# Patient Record
Sex: FEMALE | Race: WHITE | NOT HISPANIC OR LATINO | URBAN - METROPOLITAN AREA
[De-identification: names, ages, dates, MRNs, and addresses within clinical notes are randomized per-mention and may not be internally consistent; named-entity substitution may affect disease eponyms.]

---

## 2021-03-26 DIAGNOSIS — Z23 ENCOUNTER FOR IMMUNIZATION: ICD-10-CM

## 2024-10-09 NOTE — PRE-PROCEDURE INSTRUCTIONS
No outpatient medications have been marked as taking for the 10/14/24 encounter (Hospital Encounter).    Pt reports is not taking any prescription medications / and or OTC vitamins /supplements or herbals at this time.      Pt instructed on use of cleanser for DOS and instructions for showering both night before and DOS reviewed with pt - pt verbalized understanding of instructions given  Pt also instructed on no hair or body products on skin for DOS.  No shaving with a razor blade for 7 days prior to surgery to decrease any incident of infection - electric razor is ok to use up till 24 hrs prior to DOS.  Pt instructed that if with any changes in health status between now and DOS - notify surgeon office.  Tylenol is ok to use as needed up to and including DOS with sips of water - if needed - did instruct NO NSAIDS to be used at least 3 days prior to surgery - or if given a longer hold time of NSAIDS from MD please follow MD hold instructions.  Instructed to bring photo ID and medical insurance card for DOS as forms of identification for DOS.  Also instructed pt on no jewelry or body piercings, no valuables on body for DOS. Contact lenses, if worn - need to be removed for DOS.  Pt instructed does need transport home after surgery- pt is not allowed to drive.    Pt was instructed with the many questions she had related to surgery to call surgeon office and ask to have these questions verified.     Medication instructions for day surgery reviewed. Please use only a sip of water to take your instructed medications. Avoid all over the counter vitamins, supplements and NSAIDS for one week prior to surgery per anesthesia guidelines. Tylenol is ok to take as needed.     You will receive a call one business day prior to surgery with an arrival time and hospital directions. If your surgery is scheduled on a Monday, the hospital will be calling you on the Friday prior to your surgery. If you have not heard from anyone by 8pm,  please call the hospital supervisor through the hospital  at [phone]. ([place] [phone] or [place] [phone]).    Do not eat or drink anything after midnight the night before your surgery, including candy, mints, lifesavers, or chewing gum. Do not drink alcohol 24hrs before your surgery. Try not to smoke at least 24hrs before your surgery.       Follow the pre surgery showering instructions as listed in the “My Surgical Experience Booklet” or otherwise provided by your surgeon's office. Do not use a blade to shave the surgical area 1 week before surgery. It is okay to use a clean electric clippers up to 24 hours before surgery. Do not apply any lotions, creams, including makeup, cologne, deodorant, or perfumes after showering on the day of your surgery. Do not use dry shampoo, hair spray, hair gel, or any type of hair products.     No contact lenses, eye make-up, or artificial eyelashes. Remove nail polish, including gel polish, and any artificial, gel, or acrylic nails if possible. Remove all jewelry including rings and body piercing jewelry.     Wear causal clothing that is easy to take on and off. Consider your type of surgery.    Keep any valuables, jewelry, piercings at home. Please bring any specially ordered equipment (sling, braces) if indicated.    Arrange for a responsible person to drive you to and from the hospital on the day of your surgery. Please confirm the visitor policy for the day of your procedure when you receive your phone call with an arrival time.     Call the surgeon's office with any new illnesses, exposures, or additional questions prior to surgery.    Please reference your “My Surgical Experience Booklet” for additional information to prepare for your upcoming surgery.

## 2024-10-11 ENCOUNTER — ANESTHESIA EVENT (OUTPATIENT)
Dept: PERIOP | Facility: HOSPITAL | Age: 44
End: 2024-10-11
Payer: COMMERCIAL

## 2024-10-11 PROBLEM — N39.3 SUI (STRESS URINARY INCONTINENCE, FEMALE): Status: ACTIVE | Noted: 2024-10-11

## 2024-10-12 NOTE — DISCHARGE INSTRUCTIONS
Urethral Sling Procedure   WHAT YOU NEED TO KNOW:   A bladder sling procedure is surgery to treat urinary incontinence in women. The sling acts as a hammock to keep your urethra in place and hold it closed when your bladder is full. You may have vaginal bleeding or discharge for up to a week after your surgery. Use sanitary pads. Do not use tampons. You may have some pelvic discomfort or trouble urinating.   DISCHARGE INSTRUCTIONS:   Call 911 for any of the following:   You have sudden trouble breathing.    Seek care immediately if:   Your bleeding gets worse.    You have yellow or foul smelling discharge from your vagina.    You cannot urinate, or you are urinating less than what is normal for you.    You feel confused.  Contact your healthcare provider if:   You have a fever.    You do not feel like you are able to empty your bladder completely when you urinate.    You feel the need to urinate very suddenly.    You have burning or stinging when you urinate.    You have blood in your urine.    Your skin is itchy, swollen, or you have a rash.    You have questions or concerns about your condition or care.  Medicines:   Prescription pain medicine  may be given. Ask your how to take this medicine safely.    Take your medicine as directed.  Contact your healthcare provider if you think your medicine is not helping or if you have side effects. Tell him or her if you are allergic to any medicine. Keep a list of the medicines, vitamins, and herbs you take. Include the amounts, and when and why you take them. Bring the list or the pill bottles to follow-up visits. Carry your medicine list with you in case of an emergency.  Self-catheterization:  You may need to put a catheter into your bladder after you urinate to empty any remaining urine. A catheter is a small rubber tube used to drain urine. Healthcare providers will teach you how to put the catheter in safely. This may be needed until you are completely emptying your  bladder.  Dc catheter:  You may have a Dc catheter for a short period of time. The Dc is a tube put into your bladder to drain urine into a bag. Keep the bag below your waist. This will prevent urine from flowing back into your bladder and causing an infection or other problems. Also, keep the tube free of kinks so the urine will drain properly. Do not pull on the catheter. This can cause pain and bleeding, and may cause the catheter to come out.   Activity:  Do not lift heavy objects for 6 weeks after your procedure. Do not have intercourse for 4 to 6 weeks. Do not use a tampon for 4 weeks. Ask your healthcare provider when you can return to work or your usual activities.  Do pelvic muscle exercises:  These are also called Kegel exercises. These exercises help strengthen your pelvic muscles and help prevent urine leakage. Tighten the muscles of your pelvis and hold them tight for 5 seconds, then relax for 5 seconds. Gradually work up to tightening them for 10 seconds and relaxing for 10 seconds. Do this 3 times each day.  Keep a record:  Keep a record of when you urinate and if you leak any urine. Write down what you were doing when you leaked urine, such as coughing or sneezing. Bring the log to your follow-up visits.  Prevent constipation:  Drink liquids as directed. You may need to drink more water than usual to soften your bowel movements. Eat a variety of healthy foods, especially fruit and foods high in fiber. You may need to use an over-the-counter bowel movement softener.  Follow up with your healthcare provider as directed:  You may need a test to check how much urine remains in your bladder after you urinate. This will help show how the sling is working. Write down your questions so you remember to ask them during your visits.  © 2017 [employer] Information is for End User's use only and may not be sold, redistributed or otherwise used for commercial purposes. All illustrations  and images included in CareNotes® are the copyrighted property of [employer], [employer]. or [employer].  The above information is an  only. It is not intended as medical advice for individual conditions or treatments. Talk to your doctor, nurse or pharmacist before following any medical regimen to see if it is safe and effective for you.

## 2024-10-14 ENCOUNTER — ANESTHESIA (OUTPATIENT)
Dept: PERIOP | Facility: HOSPITAL | Age: 44
End: 2024-10-14
Payer: COMMERCIAL

## 2024-10-14 ENCOUNTER — HOSPITAL ENCOUNTER (OUTPATIENT)
Facility: HOSPITAL | Age: 44
Setting detail: OUTPATIENT SURGERY
Discharge: HOME | End: 2024-10-14
Attending: OBSTETRICS & GYNECOLOGY | Admitting: OBSTETRICS & GYNECOLOGY
Payer: COMMERCIAL

## 2024-10-14 VITALS
SYSTOLIC BLOOD PRESSURE: 105 MMHG | OXYGEN SATURATION: 100 % | HEIGHT: 66 IN | TEMPERATURE: 98.5 F | HEART RATE: 57 BPM | BODY MASS INDEX: 26.29 KG/M2 | RESPIRATION RATE: 14 BRPM | WEIGHT: 163.58 LBS | DIASTOLIC BLOOD PRESSURE: 65 MMHG

## 2024-10-14 LAB
EXT PREGNANCY TEST URINE: NEGATIVE
EXT. CONTROL: NORMAL

## 2024-10-14 PROCEDURE — 51798 US URINE CAPACITY MEASURE: CPT | Performed by: OBSTETRICS & GYNECOLOGY

## 2024-10-14 PROCEDURE — 81025 URINE PREGNANCY TEST: CPT | Performed by: ANESTHESIOLOGY

## 2024-10-14 PROCEDURE — C1771 REP DEV, URINARY, W/SLING: HCPCS | Performed by: OBSTETRICS & GYNECOLOGY

## 2024-10-14 DEVICE — SINGLE INCISION SLING SYSTEM
Type: IMPLANTABLE DEVICE | Site: VAGINA | Status: FUNCTIONAL
Brand: ALTIS

## 2024-10-14 RX ORDER — ONDANSETRON 2 MG/ML
4 INJECTION INTRAMUSCULAR; INTRAVENOUS EVERY 6 HOURS PRN
Status: DISCONTINUED | OUTPATIENT
Start: 2024-10-14 | End: 2024-10-14 | Stop reason: HOSPADM

## 2024-10-14 RX ORDER — ONDANSETRON 2 MG/ML
4 INJECTION INTRAMUSCULAR; INTRAVENOUS ONCE AS NEEDED
Status: DISCONTINUED | OUTPATIENT
Start: 2024-10-14 | End: 2024-10-14 | Stop reason: HOSPADM

## 2024-10-14 RX ORDER — ACETAMINOPHEN 325 MG/1
975 TABLET ORAL ONCE
Status: COMPLETED | OUTPATIENT
Start: 2024-10-14 | End: 2024-10-14

## 2024-10-14 RX ORDER — IBUPROFEN 600 MG/1
600 TABLET, FILM COATED ORAL EVERY 6 HOURS PRN
Status: DISCONTINUED | OUTPATIENT
Start: 2024-10-14 | End: 2024-10-14 | Stop reason: HOSPADM

## 2024-10-14 RX ORDER — SODIUM CHLORIDE 9 MG/ML
125 INJECTION, SOLUTION INTRAVENOUS CONTINUOUS
Status: DISCONTINUED | OUTPATIENT
Start: 2024-10-14 | End: 2024-10-14

## 2024-10-14 RX ORDER — MAGNESIUM HYDROXIDE 1200 MG/15ML
LIQUID ORAL AS NEEDED
Status: DISCONTINUED | OUTPATIENT
Start: 2024-10-14 | End: 2024-10-14 | Stop reason: HOSPADM

## 2024-10-14 RX ORDER — PROPOFOL 10 MG/ML
INJECTION, EMULSION INTRAVENOUS CONTINUOUS PRN
Status: DISCONTINUED | OUTPATIENT
Start: 2024-10-14 | End: 2024-10-14

## 2024-10-14 RX ORDER — KETOROLAC TROMETHAMINE 30 MG/ML
INJECTION, SOLUTION INTRAMUSCULAR; INTRAVENOUS AS NEEDED
Status: DISCONTINUED | OUTPATIENT
Start: 2024-10-14 | End: 2024-10-14

## 2024-10-14 RX ORDER — ACETAMINOPHEN 325 MG/1
975 TABLET ORAL EVERY 6 HOURS PRN
Status: DISCONTINUED | OUTPATIENT
Start: 2024-10-14 | End: 2024-10-14 | Stop reason: HOSPADM

## 2024-10-14 RX ORDER — DEXAMETHASONE SODIUM PHOSPHATE 10 MG/ML
INJECTION, SOLUTION INTRAMUSCULAR; INTRAVENOUS AS NEEDED
Status: DISCONTINUED | OUTPATIENT
Start: 2024-10-14 | End: 2024-10-14

## 2024-10-14 RX ORDER — DEXMEDETOMIDINE HYDROCHLORIDE 100 UG/ML
INJECTION, SOLUTION INTRAVENOUS AS NEEDED
Status: DISCONTINUED | OUTPATIENT
Start: 2024-10-14 | End: 2024-10-14

## 2024-10-14 RX ORDER — FENTANYL CITRATE/PF 50 MCG/ML
25 SYRINGE (ML) INJECTION
Status: DISCONTINUED | OUTPATIENT
Start: 2024-10-14 | End: 2024-10-14 | Stop reason: HOSPADM

## 2024-10-14 RX ORDER — PROPOFOL 10 MG/ML
INJECTION, EMULSION INTRAVENOUS AS NEEDED
Status: DISCONTINUED | OUTPATIENT
Start: 2024-10-14 | End: 2024-10-14

## 2024-10-14 RX ORDER — FENTANYL CITRATE 50 UG/ML
INJECTION, SOLUTION INTRAMUSCULAR; INTRAVENOUS AS NEEDED
Status: DISCONTINUED | OUTPATIENT
Start: 2024-10-14 | End: 2024-10-14

## 2024-10-14 RX ORDER — CLINDAMYCIN PHOSPHATE 900 MG/50ML
900 INJECTION, SOLUTION INTRAVENOUS ONCE
Status: COMPLETED | OUTPATIENT
Start: 2024-10-14 | End: 2024-10-14

## 2024-10-14 RX ORDER — ONDANSETRON 2 MG/ML
INJECTION INTRAMUSCULAR; INTRAVENOUS AS NEEDED
Status: DISCONTINUED | OUTPATIENT
Start: 2024-10-14 | End: 2024-10-14

## 2024-10-14 RX ORDER — MIDAZOLAM HYDROCHLORIDE 2 MG/2ML
INJECTION, SOLUTION INTRAMUSCULAR; INTRAVENOUS AS NEEDED
Status: DISCONTINUED | OUTPATIENT
Start: 2024-10-14 | End: 2024-10-14

## 2024-10-14 RX ORDER — LIDOCAINE HYDROCHLORIDE 20 MG/ML
INJECTION, SOLUTION EPIDURAL; INFILTRATION; INTRACAUDAL; PERINEURAL AS NEEDED
Status: DISCONTINUED | OUTPATIENT
Start: 2024-10-14 | End: 2024-10-14

## 2024-10-14 RX ADMIN — FENTANYL CITRATE 50 MCG: 50 INJECTION INTRAMUSCULAR; INTRAVENOUS at 09:39

## 2024-10-14 RX ADMIN — PROPOFOL 140 MCG/KG/MIN: 10 INJECTION, EMULSION INTRAVENOUS at 09:31

## 2024-10-14 RX ADMIN — ACETAMINOPHEN 975 MG: 325 TABLET, FILM COATED ORAL at 07:29

## 2024-10-14 RX ADMIN — PROPOFOL 50 MG: 10 INJECTION, EMULSION INTRAVENOUS at 10:08

## 2024-10-14 RX ADMIN — FENTANYL CITRATE 50 MCG: 50 INJECTION INTRAMUSCULAR; INTRAVENOUS at 10:11

## 2024-10-14 RX ADMIN — DEXMEDETOMIDINE HCL 4 MCG: 100 INJECTION INTRAVENOUS at 09:26

## 2024-10-14 RX ADMIN — FENTANYL CITRATE 50 MCG: 50 INJECTION INTRAMUSCULAR; INTRAVENOUS at 09:29

## 2024-10-14 RX ADMIN — LIDOCAINE HYDROCHLORIDE 80 MG: 20 INJECTION, SOLUTION EPIDURAL; INFILTRATION; INTRACAUDAL at 09:29

## 2024-10-14 RX ADMIN — SODIUM CHLORIDE: 0.9 INJECTION, SOLUTION INTRAVENOUS at 10:34

## 2024-10-14 RX ADMIN — DEXAMETHASONE SODIUM PHOSPHATE 10 MG: 10 INJECTION INTRAMUSCULAR; INTRAVENOUS at 09:38

## 2024-10-14 RX ADMIN — CLINDAMYCIN PHOSPHATE 900 MG: 900 INJECTION, SOLUTION INTRAVENOUS at 09:34

## 2024-10-14 RX ADMIN — GENTAMICIN SULFATE 90 MG: 40 INJECTION, SOLUTION INTRAMUSCULAR; INTRAVENOUS at 09:42

## 2024-10-14 RX ADMIN — ONDANSETRON 4 MG: 2 INJECTION INTRAMUSCULAR; INTRAVENOUS at 09:51

## 2024-10-14 RX ADMIN — SODIUM CHLORIDE 125 ML/HR: 0.9 INJECTION, SOLUTION INTRAVENOUS at 07:40

## 2024-10-14 RX ADMIN — KETOROLAC TROMETHAMINE 30 MG: 30 INJECTION, SOLUTION INTRAMUSCULAR; INTRAVENOUS at 10:34

## 2024-10-14 RX ADMIN — PROPOFOL 50 MG: 10 INJECTION, EMULSION INTRAVENOUS at 09:29

## 2024-10-14 RX ADMIN — MIDAZOLAM 2 MG: 1 INJECTION INTRAMUSCULAR; INTRAVENOUS at 09:25

## 2024-10-14 RX ADMIN — FENTANYL CITRATE 50 MCG: 50 INJECTION INTRAMUSCULAR; INTRAVENOUS at 09:25

## 2024-10-14 NOTE — ANESTHESIA POSTPROCEDURE EVALUATION
Post-Op Assessment Note    CV Status:  Stable  Pain Score: 0    Pain management: adequate    Multimodal analgesia used between 6 hours prior to anesthesia start to PACU discharge    Mental Status:  Sleepy and arousable   Hydration Status:  Stable   PONV Controlled:  None   Airway Patency:  Patent and adequate     Post Op Vitals Reviewed: Yes    No anethesia notable event occurred.    Staff: CRNA           Last Filed PACU Vitals:  Vitals Value Taken Time   Temp 98.4 °F (36.9 °C) 10/14/24 1044   Pulse 65 10/14/24 1044   BP 93/53 10/14/24 1044   Resp 16 10/14/24 1044   SpO2 97 % 10/14/24 1044       Modified Kat:  No data recorded

## 2024-10-14 NOTE — ANESTHESIA PREPROCEDURE EVALUATION
Procedure:  POSTERIOR COLPORRHAPHY; EUA (Perineum)  PV SLING (Vagina )  CYSTO (Bladder)    Relevant Problems   No relevant active problems        Physical Exam    Airway    Mallampati score: III  TM Distance: >3 FB  Neck ROM: full     Dental   No notable dental hx     Cardiovascular  Cardiovascular exam normal    Pulmonary  Pulmonary exam normal     Other Findings  post-pubertal.      Anesthesia Plan  ASA Score- 1     Anesthesia Type- IV sedation with anesthesia with ASA Monitors.         Additional Monitors:     Airway Plan:            Plan Factors-Exercise tolerance (METS): >4 METS.    Chart reviewed. EKG reviewed.  Existing labs reviewed. Patient summary reviewed.    Patient is not a current smoker.              Induction- intravenous.    Postoperative Plan- Plan for postoperative opioid use.         Informed Consent- Anesthetic plan and risks discussed with patient.  I personally reviewed this patient with the CRNA. Discussed and agreed on the Anesthesia Plan with the CRNA..

## 2024-10-14 NOTE — ANESTHESIA POSTPROCEDURE EVALUATION
Post-Op Assessment Note    Last Filed PACU Vitals:  Vitals Value Taken Time   Temp 98.1 °F (36.7 °C) 10/14/24 1114   Pulse 58 10/14/24 1124   /59 10/14/24 1114   Resp 14 10/14/24 1124   SpO2 100 % 10/14/24 1124       Modified Kat:  Activity: 2 (10/14/2024 10:59 AM)  Respiration: 2 (10/14/2024 10:59 AM)  Circulation: 2 (10/14/2024 10:59 AM)  Consciousness: 2 (10/14/2024 10:59 AM)  Oxygen Saturation: 2 (10/14/2024 10:59 AM)  Modified Kat Score: 10 (10/14/2024 10:59 AM)

## 2024-10-14 NOTE — OP NOTE
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]    Post-Op Diagnosis Codes:     * Rectocele [N81.6]     * Stress incontinence (female) (male) [N39.3]     * 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]    Procedure(s):  POSTERIOR COLPORRHAPHY; EUA  PV SLING  CYSTO    Specimen(s):  * No specimens in log *    Estimated Blood Loss:   Minimal    Drains:  * No LDAs found *    Anesthesia Type:   IV Sedation with Anesthesia    Operative Indications:  Rectocele [N81.6]  Stress incontinence (female) (male) [N39.3]  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 Bilateral ureteral efflux visualized.       Complications:   None apparent    Procedure and Technique:  Appropriate preoperative antibiotics chosen per ACOG guidelines were given.  Bilateral SCDs were placed in the lower extremities for DVT prevention prior to the institution of anesthesia.    The patient was identified in the holding area by the operating room staff and attending physician. She was taken to the operating room where anesthesia was instituted without complications. She was placed in the dorsal lithotomy position with the legs in Kai stirrups with care taken to avoid excessive flexion or  extension of her lower extremities. The patient was prepped and draped in the usual sterile fashion. A Dc catheter was inserted.    We turned our attention for performance of the single incision sling. A transobturator block was performed at the level of the urethral meatus just lateral to the inguinal fold with diluted Marcaine 25% with epinephrine in normal saline. At this time, the mid urethral zone was identified in reference to the Dc catheter and urethral meatus and local anesthetic solution was injected into the anterior vaginal wall at the mid urethral level for hydrodissection and vasoconstriction. Next, local was injected at the midline and to the left and right of midline directing the infiltration laterally towards the cephalad aspect of the inferior pubic ramus bilaterally. Care was taken to ensure that the sulcus was flattened and free of infiltration to minimize chance for buttonholing or tapping too close to the anterolateral sulcus. After completion of hydrodissection, 2 Allis clamps were used to grasp the anterior vaginal wall for traction. A 1.5 cm incision was made to the midline. Two Allis clamps were then placed on each cut edge of the incision for stabilization. Tenotomy scissors were used to create a small vaginal tunnel with sharp and blunt dissection above the anterior vaginal wall directed laterally towards the cephalad aspect of the inferior pubic ramus bilaterally. Dissection was carried out to the edge of the bone itself but no dissection into the obturator internus muscle. Once the dissection was complete, the sling was placed. The Altis system was used. An index finger was placed in the vagina for guidance. The Altis trocar was placed into the pre-dissected tract. The handle was held in horizontal slight upward canting to avoid buttonholing of the sulcus. Cephalad drift was used to allow passage around the inferior pubic ramus. A thumb was placed on the heel of the introducer to  allow a push/pivot maneuver to place a fixed anchor into the obturator internus muscle membrane complex on the patient's left side. Proper handle deviation confirmed proper anchor placement, as well as a gentle tugging on the sling. Once the introducer was removed, it also confirmed proper anchor placement. The exact same sequence of steps was repeated on the patient's right side with adjustable anchor. Once it was complete, the introducer was removed and gentle tugging again confirmed proper anchor placement. The Dc catheter was then used to drain the bladder and then it was removed and a diagnostic cystoscopy was performed by instilling 300 mL of fluid into the bladder. Both ureters were effluxing normally and there were no lacerations or injury in the lower urinary tract. We used patient cough to elicit loss of fluid from the urethral meatus and there was loss of fluid noted. The tensioning suture was used to adjust the tape until there was minimal to no leakage with cough. After appropriate tensioning, the tensioning suture was cut and the vaginal incision was closed with 2-0 Vicryl suture in a running locked stitch. At this time, we placed the Dc back in the bladder.     Attention was then turned to the posterior compartment where 2 Allis clamps were placed in the posterior fourchette over the mucocutaneous border to reduce the markedly relaxed vaginal outlet. Dilute Marcaine solution was injected into the perineum. A jody-shaped incision was made extending from the vaginal mucosa onto the perineum. The overlying skin was removed en bloc.  We then began closure of the posterior colporrhaphy with a 2-0 Vicryl suture in a running locked stitch. We reapproximated the perineal body with a 0-Vicryl interrupted suture. We closed the remainder of the colporrhaphy to the level of the hymen. We closed the perineal skin with 2-0 Vicryl in a subcuticular fashion.    No bladder, ureteral, viscus, or solid organ  injury were noted at the end of the procedure. Irrigation was performed. We completed the procedure. Vaginal packing was placed in the vagina. There were no complications.The sponge, needle and instrument count were correct x2. The patient tolerated the procedure well and went to the recovery room in stable condition and she is stable at the moment of this dictation.    No qualified resident was available to assist with procedure. Dr. Josue was present for the entire procedure.      Patient Disposition:  PACU  and hemodynamically stable             SIGNATURE: Di White MD  DATE: October 14, 2024  TIME: 10:52 AM

## 2025-08-14 ENCOUNTER — TELEPHONE (OUTPATIENT)
Dept: OBGYN CLINIC | Facility: MEDICAL CENTER | Age: 45
End: 2025-08-14

## 2025-08-15 ENCOUNTER — APPOINTMENT (OUTPATIENT)
Dept: RADIOLOGY | Facility: MEDICAL CENTER | Age: 45
End: 2025-08-15
Attending: ORTHOPAEDIC SURGERY
Payer: COMMERCIAL

## 2025-08-15 ENCOUNTER — OFFICE VISIT (OUTPATIENT)
Dept: OBGYN CLINIC | Facility: MEDICAL CENTER | Age: 45
End: 2025-08-15
Payer: COMMERCIAL

## (undated) DEVICE — BULB SYRINGE,IRRIGATION WITH PROTECTIVE CAP: Brand: DOVER

## (undated) DEVICE — CATH FOLEY 18FR 5ML 2 WAY UNCOATED SILICONE

## (undated) DEVICE — SUT VICRYL 0 CT-1 36 IN J946H

## (undated) DEVICE — PREMIUM DRY TRAY LF: Brand: MEDLINE INDUSTRIES, INC.

## (undated) DEVICE — UNDER BUTTOCKS DRAPE W/FLUID CONTROL POUCH: Brand: CONVERTORS

## (undated) DEVICE — GLOVE PI ULTRA TOUCH SZ.8.0

## (undated) DEVICE — DECANTER: Brand: UNBRANDED

## (undated) DEVICE — SMOKE EVACUATION TUBING WITH 8 IN INTEGRAL WAND AND SPONGE GUARD: Brand: BUFFALO FILTER

## (undated) DEVICE — SUT VICRYL 2-0 SH 27 IN UNDYED J417H

## (undated) DEVICE — ELECTROSURGICAL DEVICE HOLSTER;FOR USE WITH MAXIMUM PEAK VOLTAGE OF 4000 V: Brand: FORCE TRIVERSE

## (undated) DEVICE — NEEDLE HYPO 23G X 1-1/2 IN

## (undated) DEVICE — 2000CC GUARDIAN II: Brand: GUARDIAN

## (undated) DEVICE — MEDI-VAC YANKAUER SUCTION HANDLE W/BULBOUS AND CONTROL VENT: Brand: CARDINAL HEALTH

## (undated) DEVICE — TUBING SUCTION 5MM X 12 FT

## (undated) DEVICE — SCD SEQUENTIAL COMPRESSION COMFORT SLEEVE MEDIUM KNEE LENGTH: Brand: KENDALL SCD

## (undated) DEVICE — CAUTERY TIP POLISHER: Brand: DEVON

## (undated) DEVICE — ALLENTOWN DR  LUCENTE S LAP PK: Brand: CARDINAL HEALTH

## (undated) DEVICE — EXIDINE 4 PCT

## (undated) DEVICE — INTENDED FOR TISSUE SEPARATION, AND OTHER PROCEDURES THAT REQUIRE A SHARP SURGICAL BLADE TO PUNCTURE OR CUT.: Brand: BARD-PARKER SAFETY BLADES SIZE 11, STERILE